# Patient Record
(demographics unavailable — no encounter records)

---

## 2025-07-22 NOTE — PHYSICAL EXAM
[TextEntry] : Blood pressure measures 138/78.  Patient has bruising on the face, left hand, left forearm.

## 2025-07-22 NOTE — PLAN
[FreeTextEntry1] : 1.  Essential hypertension.  Blood pressure treated with Norvasc, 5 mg/day; chlorthalidone, 25 mg a day.  Recent potassium was low.  Patient on potassium supplement Monday Wednesday Friday.  Will be seen next week with repeat potassium testing.  2.  Fatigue.  No thyroid testing done.  Will explore symptoms of depression.  3.  History of breast cancer.  4.  History of pheochromocytoma.  5.  Hyperlipidemia, treated.  To be monitored.  6.  Osteopenia.  No FRAX provided.  Will contact [place] [facility].  7.  Tremor.  Agree with neurology evaluation.  Concern reParkinson's disease.  8.  Undifferentiated connective tissue disease.  Labs reviewed along with Dr. Lois Campos's note.  This is a 1 hour face-to-face office visit with the majority of the time spent in discussion and review of records.

## 2025-07-22 NOTE — HISTORY OF PRESENT ILLNESS
[FreeTextEntry1] : Patient is seeing me today for the first time.  She is an 87-year-old woman who is treated for hypertension, hyperlipidemia, lupus.  She is referred to me by Dr. Lois Nolan, with whom she recently met.  She relates that she lives in [place] for 6 months of the year and then purchased for the other 6 months.  She was in her usual state of health until April, when she had hand swelling and stiffness.  She self-referred to a rheumatologist and was found to have a positive ILIA, a borderline double-stranded DNA, and a positive chromatin antibody.  She received prednisone, 20 mg a day and hydroxychloroquine.  She developed a generalized rash after 3 days.  She is presently treated with leflunomide, 10 mg a day plus prednisone, 4 mg a day.  She is tapering her prednisone by 1 mg every month.  Due to fatigue, sluggishness, tremor, weight loss she is brought in by her family today.  She does admit to resting tremor in hands and feet.  She has mild cognitive dysfunction, mostly with remembering chronology of tests.  She recently fell, and had a CAT scan at [facility].

## 2025-07-22 NOTE — HEALTH RISK ASSESSMENT
[2 - 4 times a month (2 pts)] : 2-4 times a month (2 points) [1 or 2 (0 pts)] : 1 or 2 (0 points) [No] : In the past 12 months have you used drugs other than those required for medical reasons? No [One fall no injury in past year] : Patient reported one fall in the past year without injury [0] : 2) Feeling down, depressed, or hopeless: Not at all (0) [PHQ-2 Negative - No further assessment needed] : PHQ-2 Negative - No further assessment needed [Audit-CScore] : 0 [[id number]] : 0 [Never] : Never [0-4] : 0-4 [Patient reported bone density results were abnormal] : Patient reported bone density results were abnormal [BoneDensityDate] : 07/25

## 2025-07-28 NOTE — PHYSICAL EXAM
[TextEntry] : Blood pressure measured at 134/88 on both sides as well as on standing.  There is a resting tremor in hands and feet, right-sided predominant.  There is no rigidity.  She does exhibit bradykinesia but no retropulsion.

## 2025-07-28 NOTE — HISTORY OF PRESENT ILLNESS
[FreeTextEntry1] : Patient comes in today for blood pressure follow-up.  Also, records have been received from outside practices and I have the opportunity to review them at this visit.  Multiple other issues are addressed at this time.

## 2025-07-28 NOTE — PLAN
[FreeTextEntry1] : 1.  Compression fracture.  This occurred late winter or this past spring.  She is using Tylenol and has improvement.  2.  Essential hypertension.  Has been variably on Norvasc, Diovan, hydrochlorothiazide.  Presently taking Norvasc, 5 mg twice a day and hydrochlorothiazide, 25 mg/day.  No change in therapy.  Recently began potassium supplement.  Will do electrolytes at next visit.  3.  Fatigue.  Not anemic nor hypothyroid.  Some sleep disturbance.  Suspect this is due to her parkinsonism.  4.  Glucose intolerance.  Likely secondary to prednisone usage. This is a 1 hour face-to-face visit with patient, , daughter-in-law.  Diagnosis of Parkinson's disease introduced and explored.   5.  Hyperlipidemia.  She is on Lipitor and at goal.  No change in therapy.  6.  Hypokalemia.  To be monitored.  7.  Osteopenia.  Increased risk of fracture.  Alendronate to be initiated in the near future.  8.  Positive ILIA.  Also has very low titer double-stranded DNA, and low C4.  Presented with swelling of hands.  Has responded to prednisone and leflunomide.  Notes from multiple rheumatologists reviewed.  Best diagnosis appears to be undifferentiated connective tissue disease.  9.  Primary parkinsonism.  Await neurology visit.  10.  Tremor.  As above.  11.  Undifferentiated connective tissue disease, improved.  Continue medication.